# Patient Record
Sex: MALE | Race: OTHER | HISPANIC OR LATINO | URBAN - METROPOLITAN AREA
[De-identification: names, ages, dates, MRNs, and addresses within clinical notes are randomized per-mention and may not be internally consistent; named-entity substitution may affect disease eponyms.]

---

## 2020-04-26 ENCOUNTER — EMERGENCY (EMERGENCY)
Facility: HOSPITAL | Age: 40
LOS: 0 days | Discharge: HOME | End: 2020-04-27
Attending: EMERGENCY MEDICINE
Payer: SELF-PAY

## 2020-04-26 VITALS
HEART RATE: 105 BPM | DIASTOLIC BLOOD PRESSURE: 99 MMHG | RESPIRATION RATE: 18 BRPM | TEMPERATURE: 98 F | WEIGHT: 130.07 LBS | HEIGHT: 64 IN | SYSTOLIC BLOOD PRESSURE: 157 MMHG

## 2020-04-26 DIAGNOSIS — Z59.0 HOMELESSNESS: ICD-10-CM

## 2020-04-26 DIAGNOSIS — F10.120 ALCOHOL ABUSE WITH INTOXICATION, UNCOMPLICATED: ICD-10-CM

## 2020-04-26 DIAGNOSIS — R00.0 TACHYCARDIA, UNSPECIFIED: ICD-10-CM

## 2020-04-26 PROCEDURE — 80053 COMPREHEN METABOLIC PANEL: CPT

## 2020-04-26 PROCEDURE — 82962 GLUCOSE BLOOD TEST: CPT

## 2020-04-26 PROCEDURE — 99285 EMERGENCY DEPT VISIT HI MDM: CPT

## 2020-04-26 PROCEDURE — 36415 COLL VENOUS BLD VENIPUNCTURE: CPT

## 2020-04-26 PROCEDURE — 85025 COMPLETE CBC W/AUTO DIFF WBC: CPT

## 2020-04-26 PROCEDURE — 80307 DRUG TEST PRSMV CHEM ANLYZR: CPT

## 2020-04-26 PROCEDURE — 99284 EMERGENCY DEPT VISIT MOD MDM: CPT

## 2020-04-26 PROCEDURE — 93005 ELECTROCARDIOGRAM TRACING: CPT

## 2020-04-26 SDOH — ECONOMIC STABILITY - HOUSING INSECURITY: HOMELESSNESS: Z59.0

## 2020-04-26 NOTE — ED PROVIDER NOTE - PROGRESS NOTE DETAILS
Male BIBA found in parking Lot Homeless after drinking Beer. No abdominal pain ,no fever or chills. Seen and examined by attending in Intake. Plan: BGM and waiting to sober for discharge.   Ghassan STRONG JG:  Received signout from Dr. Duncan and TAE Giraldo to re-evaluate patient when awake and sober. Patient now awake, able to ambulate, able to give name and address and has [facility] identification card on him.  Registered with real name now instead of alias.  Requests help with housing and states he is homeless and has nowhere to go and needs help.  Requests social work help in AM.  Denies SI or HI.  Arrived in ED at same time as male cardiac arrest from same park location, but patient has no memory of event.  Knows he was drinking in same park as another male but states he doesn't know him well or know his name. Pt s/o to me.  Care assumed from Dr. Samano.  Presenting for etoh intox.  In need of housing and would like to discuss with SW.  Pending SW consult in AM for housing options. Pt noted to be tachy on reassessment.  Labs drawn, IVF  provided as well  as librium.  NO tremulousness.  Normal mental status.  Pt seen by SBIRT and declines placement in shelter.  Declines emergency housing as well.  Would just like to go home.  I discussed patient's elevated HR and he still declines to stay in ED for further w/u and management.  Understands possible risks of discharge.  Still would like to go.  D/c home with strict return precautions and prompt outpatient f/u.

## 2020-04-26 NOTE — ED PROVIDER NOTE - NSFOLLOWUPINSTRUCTIONS_ED_ALL_ED_FT
Alcohol Use Disorder    WHAT YOU NEED TO KNOW:    Alcohol use disorder (AUD) is problem drinking. AUD includes alcohol abuse and alcohol dependency.     DISCHARGE INSTRUCTIONS:    Seek care immediately if:     Your heart is beating faster than usual.      You have hallucinations.      You cannot remember what happens while you are drinking.      You have seizures.    Contact your healthcare provider if:     You are anxious and have nausea.      Your hands are shaky and you are sweating heavily.      You have questions or concerns about your condition or care.    Follow up with your healthcare provider as directed: Do not try to stop drinking on your own. Your healthcare provider may need to help you withdraw from alcohol safely. He may need to admit you to the hospital. You may also need any of the following treatments:    Medicines to decrease your craving for alcohol      Support groups such as Alcoholics Anonymous       Therapy from a psychiatrist or psychologist       Admission to an inpatient facility for treatment for severe AUD    Interested in discussing options to reduce your alcohol or drug use?      [facility]: [phone]   [facility] Substance Abuse Services: [phone], option #2   Methadone Maintenance & Ambulatory Opiate Detox: [phone]  [facility]: [phone]  [facility]: [phone]  [facility]: [phone]    [facility]: [phone], option #2   [facility]: [phone]    [facility]: [phone]    [facility] Central Intake: [phone]  [facility] Chemical Dependency/Ancillary Withdrawal: [phone]  [facility] Methadone Maintenance: [phone]    [facility]: [phone]  [facility] Addiction Treatment Services: [phone]    [facility] HopeLine: 0-191-6-HOPENY    [facility] Office of Alcoholism and Substance Abuse Services (OASAS): https://www.oasas.[place].gov/providerdirectory/  [facility] for Addiction Services and Psychotherapy Interventions Research (CASPIR)  www.[facility].org     Interested in discussing options to reduce your tobacco use?    [facility] for Tobacco Control:  [phone]  [facility] QUITLINE: 9-336-YN-QUITS ([phone])    Interested in learning more about substance use?      http://rethinkingdrinking.niaaa.nih.gov   https://www.drugabuse.gov/patients-families     Learn more about opioid overdose prevention programs in [facility]:  http://www.[facility].ny.[facility]/diseases/aids/general/opioid_overdose_prevention/

## 2020-04-26 NOTE — ED PROVIDER NOTE - PATIENT PORTAL LINK FT
You can access the FollowMyHealth Patient Portal offered by [facility] by registering at the following website: [email or web address]. By joining [employer]’s FollowMyHealth portal, you will also be able to view your health information using other applications (apps) compatible with our system.

## 2020-04-26 NOTE — ED ADULT NURSE NOTE - OBJECTIVE STATEMENT
pt was found outside intoxicated. pt speaks Chilean. pt states he drank a lot of beer and started crying saying he had no home. pt states he just needs to sleep it off.

## 2020-04-26 NOTE — ED ADULT NURSE NOTE - CHIEF COMPLAINT QUOTE
Pt found in public parking lot with alcohol at side. Unable to give  with a Divehi speaking RN.  Pt awake and alert, but appears intoxicated. No visible sign of injury noted.

## 2020-04-26 NOTE — ED PROVIDER NOTE - OBJECTIVE STATEMENT
62 y/o male with no PMHx, presents to the ED for alcohol intoxication. Pt was found in a public parking lot with alcohol at side. States he is homeless. Denies pain, fever, abd pain.

## 2020-04-26 NOTE — ED PROVIDER NOTE - CARE PLAN
Principal Discharge DX:	Alcoholic intoxication without complication  Secondary Diagnosis:	Tachycardia  Secondary Diagnosis:	Homelessness

## 2020-04-26 NOTE — ED ADULT TRIAGE NOTE - CHIEF COMPLAINT QUOTE
Pt found in public parking lot with alcohol at side. Unable to give  with a Malay speaking RN.  Pt awake and alert, but appears intoxicated. No visible sign of injury noted.

## 2020-04-27 VITALS
OXYGEN SATURATION: 94 % | SYSTOLIC BLOOD PRESSURE: 155 MMHG | HEART RATE: 120 BPM | DIASTOLIC BLOOD PRESSURE: 98 MMHG | RESPIRATION RATE: 18 BRPM

## 2020-04-27 LAB
ALBUMIN SERPL ELPH-MCNC: 3.9 G/DL — SIGNIFICANT CHANGE UP (ref 3.3–5)
ALP SERPL-CCNC: 96 U/L — SIGNIFICANT CHANGE UP (ref 40–120)
ALT FLD-CCNC: 314 U/L — HIGH (ref 12–78)
ANION GAP SERPL CALC-SCNC: 12 MMOL/L — SIGNIFICANT CHANGE UP (ref 5–17)
AST SERPL-CCNC: 251 U/L — HIGH (ref 15–37)
BASOPHILS # BLD AUTO: 0.04 K/UL — SIGNIFICANT CHANGE UP (ref 0–0.2)
BASOPHILS NFR BLD AUTO: 0.9 % — SIGNIFICANT CHANGE UP (ref 0–2)
BILIRUB SERPL-MCNC: 0.4 MG/DL — SIGNIFICANT CHANGE UP (ref 0.2–1.2)
BUN SERPL-MCNC: 11 MG/DL — SIGNIFICANT CHANGE UP (ref 7–23)
CALCIUM SERPL-MCNC: 8.7 MG/DL — SIGNIFICANT CHANGE UP (ref 8.5–10.1)
CHLORIDE SERPL-SCNC: 101 MMOL/L — SIGNIFICANT CHANGE UP (ref 96–108)
CO2 SERPL-SCNC: 23 MMOL/L — SIGNIFICANT CHANGE UP (ref 22–31)
CREAT SERPL-MCNC: 0.67 MG/DL — SIGNIFICANT CHANGE UP (ref 0.5–1.3)
EOSINOPHIL # BLD AUTO: 0.04 K/UL — SIGNIFICANT CHANGE UP (ref 0–0.5)
EOSINOPHIL NFR BLD AUTO: 0.9 % — SIGNIFICANT CHANGE UP (ref 0–6)
ETHANOL SERPL-MCNC: 71 MG/DL — HIGH (ref 0–10)
GLUCOSE SERPL-MCNC: 83 MG/DL — SIGNIFICANT CHANGE UP (ref 70–99)
HCT VFR BLD CALC: 40.3 % — SIGNIFICANT CHANGE UP (ref 39–50)
HGB BLD-MCNC: 14.3 G/DL — SIGNIFICANT CHANGE UP (ref 13–17)
IMM GRANULOCYTES NFR BLD AUTO: 0.2 % — SIGNIFICANT CHANGE UP (ref 0–1.5)
LYMPHOCYTES # BLD AUTO: 0.96 K/UL — LOW (ref 1–3.3)
LYMPHOCYTES # BLD AUTO: 21.8 % — SIGNIFICANT CHANGE UP (ref 13–44)
MCHC RBC-ENTMCNC: 32.1 PG — SIGNIFICANT CHANGE UP (ref 27–34)
MCHC RBC-ENTMCNC: 35.5 GM/DL — SIGNIFICANT CHANGE UP (ref 32–36)
MCV RBC AUTO: 90.6 FL — SIGNIFICANT CHANGE UP (ref 80–100)
MONOCYTES # BLD AUTO: 0.42 K/UL — SIGNIFICANT CHANGE UP (ref 0–0.9)
MONOCYTES NFR BLD AUTO: 9.5 % — SIGNIFICANT CHANGE UP (ref 2–14)
NEUTROPHILS # BLD AUTO: 2.93 K/UL — SIGNIFICANT CHANGE UP (ref 1.8–7.4)
NEUTROPHILS NFR BLD AUTO: 66.7 % — SIGNIFICANT CHANGE UP (ref 43–77)
PLATELET # BLD AUTO: 133 K/UL — LOW (ref 150–400)
POTASSIUM SERPL-MCNC: 3.5 MMOL/L — SIGNIFICANT CHANGE UP (ref 3.5–5.3)
POTASSIUM SERPL-SCNC: 3.5 MMOL/L — SIGNIFICANT CHANGE UP (ref 3.5–5.3)
PROT SERPL-MCNC: 8 GM/DL — SIGNIFICANT CHANGE UP (ref 6–8.3)
RBC # BLD: 4.45 M/UL — SIGNIFICANT CHANGE UP (ref 4.2–5.8)
RBC # FLD: 15.7 % — HIGH (ref 10.3–14.5)
SODIUM SERPL-SCNC: 136 MMOL/L — SIGNIFICANT CHANGE UP (ref 135–145)
WBC # BLD: 4.4 K/UL — SIGNIFICANT CHANGE UP (ref 3.8–10.5)
WBC # FLD AUTO: 4.4 K/UL — SIGNIFICANT CHANGE UP (ref 3.8–10.5)

## 2020-04-27 PROCEDURE — 93010 ELECTROCARDIOGRAM REPORT: CPT

## 2020-04-27 RX ORDER — SODIUM CHLORIDE 9 MG/ML
1000 INJECTION INTRAMUSCULAR; INTRAVENOUS; SUBCUTANEOUS ONCE
Refills: 0 | Status: COMPLETED | OUTPATIENT
Start: 2020-04-27 | End: 2020-04-27

## 2020-04-27 RX ADMIN — SODIUM CHLORIDE 1000 MILLILITER(S): 9 INJECTION INTRAMUSCULAR; INTRAVENOUS; SUBCUTANEOUS at 09:00

## 2020-04-27 RX ADMIN — Medication 100 MILLIGRAM(S): at 09:00

## 2020-04-27 NOTE — ED ADULT NURSE REASSESSMENT NOTE - NS ED NURSE REASSESS COMMENT FT1
pt resting comfortably in stretcher. awaiting SW consult  because pt is homeless and looking for housing. pt in nad. no complaints.

## 2024-02-21 ENCOUNTER — EMERGENCY (EMERGENCY)
Facility: HOSPITAL | Age: 44
LOS: 0 days | Discharge: HOME | End: 2024-02-21
Attending: FAMILY MEDICINE
Payer: SELF-PAY

## 2024-02-21 VITALS
HEART RATE: 63 BPM | RESPIRATION RATE: 16 BRPM | OXYGEN SATURATION: 98 % | DIASTOLIC BLOOD PRESSURE: 79 MMHG | SYSTOLIC BLOOD PRESSURE: 112 MMHG | TEMPERATURE: 98 F

## 2024-02-21 VITALS
HEART RATE: 60 BPM | HEIGHT: 65 IN | OXYGEN SATURATION: 100 % | SYSTOLIC BLOOD PRESSURE: 119 MMHG | RESPIRATION RATE: 16 BRPM | WEIGHT: 149.91 LBS | DIASTOLIC BLOOD PRESSURE: 94 MMHG | TEMPERATURE: 97 F

## 2024-02-21 DIAGNOSIS — R41.82 ALTERED MENTAL STATUS, UNSPECIFIED: ICD-10-CM

## 2024-02-21 DIAGNOSIS — F10.129 ALCOHOL ABUSE WITH INTOXICATION, UNSPECIFIED: ICD-10-CM

## 2024-02-21 PROBLEM — Z78.9 OTHER SPECIFIED HEALTH STATUS: Chronic | Status: ACTIVE | Noted: 2020-04-30

## 2024-02-21 LAB
ALBUMIN SERPL ELPH-MCNC: 4 G/DL — SIGNIFICANT CHANGE UP (ref 3.3–5)
ALP SERPL-CCNC: 68 U/L — SIGNIFICANT CHANGE UP (ref 40–120)
ALT FLD-CCNC: 31 U/L — SIGNIFICANT CHANGE UP (ref 12–78)
AMPHET UR-MCNC: NEGATIVE — SIGNIFICANT CHANGE UP
ANION GAP SERPL CALC-SCNC: 5 MMOL/L — SIGNIFICANT CHANGE UP (ref 5–17)
APPEARANCE UR: CLEAR — SIGNIFICANT CHANGE UP
APTT BLD: 32.3 SEC — SIGNIFICANT CHANGE UP (ref 24.5–35.6)
AST SERPL-CCNC: 23 U/L — SIGNIFICANT CHANGE UP (ref 15–37)
BARBITURATES UR SCN-MCNC: NEGATIVE — SIGNIFICANT CHANGE UP
BASOPHILS # BLD AUTO: 0.08 K/UL — SIGNIFICANT CHANGE UP (ref 0–0.2)
BASOPHILS NFR BLD AUTO: 1.2 % — SIGNIFICANT CHANGE UP (ref 0–2)
BENZODIAZ UR-MCNC: NEGATIVE — SIGNIFICANT CHANGE UP
BILIRUB SERPL-MCNC: 0.3 MG/DL — SIGNIFICANT CHANGE UP (ref 0.2–1.2)
BILIRUB UR-MCNC: NEGATIVE — SIGNIFICANT CHANGE UP
BUN SERPL-MCNC: 11 MG/DL — SIGNIFICANT CHANGE UP (ref 7–23)
CALCIUM SERPL-MCNC: 9 MG/DL — SIGNIFICANT CHANGE UP (ref 8.5–10.1)
CHLORIDE SERPL-SCNC: 109 MMOL/L — HIGH (ref 96–108)
CO2 SERPL-SCNC: 29 MMOL/L — SIGNIFICANT CHANGE UP (ref 22–31)
COCAINE METAB.OTHER UR-MCNC: NEGATIVE — SIGNIFICANT CHANGE UP
COLOR SPEC: YELLOW — SIGNIFICANT CHANGE UP
CREAT SERPL-MCNC: 0.85 MG/DL — SIGNIFICANT CHANGE UP (ref 0.5–1.3)
DIFF PNL FLD: NEGATIVE — SIGNIFICANT CHANGE UP
EGFR: 109 ML/MIN/1.73M2 — SIGNIFICANT CHANGE UP
EOSINOPHIL # BLD AUTO: 0.12 K/UL — SIGNIFICANT CHANGE UP (ref 0–0.5)
EOSINOPHIL NFR BLD AUTO: 1.8 % — SIGNIFICANT CHANGE UP (ref 0–6)
ETHANOL SERPL-MCNC: 356 MG/DL — HIGH (ref 0–10)
GLUCOSE SERPL-MCNC: 97 MG/DL — SIGNIFICANT CHANGE UP (ref 70–99)
GLUCOSE UR QL: NEGATIVE MG/DL — SIGNIFICANT CHANGE UP
HCT VFR BLD CALC: 43.4 % — SIGNIFICANT CHANGE UP (ref 39–50)
HGB BLD-MCNC: 15.5 G/DL — SIGNIFICANT CHANGE UP (ref 13–17)
IMM GRANULOCYTES NFR BLD AUTO: 0.4 % — SIGNIFICANT CHANGE UP (ref 0–0.9)
INR BLD: 0.94 RATIO — SIGNIFICANT CHANGE UP (ref 0.85–1.18)
KETONES UR-MCNC: NEGATIVE MG/DL — SIGNIFICANT CHANGE UP
LEUKOCYTE ESTERASE UR-ACNC: NEGATIVE — SIGNIFICANT CHANGE UP
LYMPHOCYTES # BLD AUTO: 2.02 K/UL — SIGNIFICANT CHANGE UP (ref 1–3.3)
LYMPHOCYTES # BLD AUTO: 29.6 % — SIGNIFICANT CHANGE UP (ref 13–44)
MAGNESIUM SERPL-MCNC: 2.6 MG/DL — SIGNIFICANT CHANGE UP (ref 1.6–2.6)
MCHC RBC-ENTMCNC: 33 PG — SIGNIFICANT CHANGE UP (ref 27–34)
MCHC RBC-ENTMCNC: 35.7 GM/DL — SIGNIFICANT CHANGE UP (ref 32–36)
MCV RBC AUTO: 92.3 FL — SIGNIFICANT CHANGE UP (ref 80–100)
METHADONE UR-MCNC: NEGATIVE — SIGNIFICANT CHANGE UP
MONOCYTES # BLD AUTO: 0.42 K/UL — SIGNIFICANT CHANGE UP (ref 0–0.9)
MONOCYTES NFR BLD AUTO: 6.2 % — SIGNIFICANT CHANGE UP (ref 2–14)
NEUTROPHILS # BLD AUTO: 4.15 K/UL — SIGNIFICANT CHANGE UP (ref 1.8–7.4)
NEUTROPHILS NFR BLD AUTO: 60.8 % — SIGNIFICANT CHANGE UP (ref 43–77)
NITRITE UR-MCNC: NEGATIVE — SIGNIFICANT CHANGE UP
OPIATES UR-MCNC: NEGATIVE — SIGNIFICANT CHANGE UP
PCP SPEC-MCNC: SIGNIFICANT CHANGE UP
PCP UR-MCNC: NEGATIVE — SIGNIFICANT CHANGE UP
PH UR: 5.5 — SIGNIFICANT CHANGE UP (ref 5–8)
PLATELET # BLD AUTO: 236 K/UL — SIGNIFICANT CHANGE UP (ref 150–400)
POTASSIUM SERPL-MCNC: 3.7 MMOL/L — SIGNIFICANT CHANGE UP (ref 3.5–5.3)
POTASSIUM SERPL-SCNC: 3.7 MMOL/L — SIGNIFICANT CHANGE UP (ref 3.5–5.3)
PROT SERPL-MCNC: 7.9 GM/DL — SIGNIFICANT CHANGE UP (ref 6–8.3)
PROT UR-MCNC: NEGATIVE MG/DL — SIGNIFICANT CHANGE UP
PROTHROM AB SERPL-ACNC: 10.6 SEC — SIGNIFICANT CHANGE UP (ref 9.5–13)
RBC # BLD: 4.7 M/UL — SIGNIFICANT CHANGE UP (ref 4.2–5.8)
RBC # FLD: 13.2 % — SIGNIFICANT CHANGE UP (ref 10.3–14.5)
SODIUM SERPL-SCNC: 143 MMOL/L — SIGNIFICANT CHANGE UP (ref 135–145)
SP GR SPEC: <1.005 — LOW (ref 1–1.03)
THC UR QL: NEGATIVE — SIGNIFICANT CHANGE UP
TROPONIN I, HIGH SENSITIVITY RESULT: 4.48 NG/L — SIGNIFICANT CHANGE UP
UROBILINOGEN FLD QL: 0.2 MG/DL — SIGNIFICANT CHANGE UP (ref 0.2–1)
WBC # BLD: 6.82 K/UL — SIGNIFICANT CHANGE UP (ref 3.8–10.5)
WBC # FLD AUTO: 6.82 K/UL — SIGNIFICANT CHANGE UP (ref 3.8–10.5)

## 2024-02-21 PROCEDURE — 85025 COMPLETE CBC W/AUTO DIFF WBC: CPT

## 2024-02-21 PROCEDURE — 36415 COLL VENOUS BLD VENIPUNCTURE: CPT

## 2024-02-21 PROCEDURE — 84484 ASSAY OF TROPONIN QUANT: CPT

## 2024-02-21 PROCEDURE — 80053 COMPREHEN METABOLIC PANEL: CPT

## 2024-02-21 PROCEDURE — 80307 DRUG TEST PRSMV CHEM ANLYZR: CPT

## 2024-02-21 PROCEDURE — 83735 ASSAY OF MAGNESIUM: CPT

## 2024-02-21 PROCEDURE — 85730 THROMBOPLASTIN TIME PARTIAL: CPT

## 2024-02-21 PROCEDURE — 96374 THER/PROPH/DIAG INJ IV PUSH: CPT

## 2024-02-21 PROCEDURE — 85610 PROTHROMBIN TIME: CPT

## 2024-02-21 PROCEDURE — 99285 EMERGENCY DEPT VISIT HI MDM: CPT | Mod: 25

## 2024-02-21 PROCEDURE — 99284 EMERGENCY DEPT VISIT MOD MDM: CPT

## 2024-02-21 PROCEDURE — 81003 URINALYSIS AUTO W/O SCOPE: CPT

## 2024-02-21 RX ORDER — SODIUM CHLORIDE 9 MG/ML
1000 INJECTION INTRAMUSCULAR; INTRAVENOUS; SUBCUTANEOUS ONCE
Refills: 0 | Status: COMPLETED | OUTPATIENT
Start: 2024-02-21 | End: 2024-02-21

## 2024-02-21 RX ORDER — THIAMINE MONONITRATE (VIT B1) 100 MG
100 TABLET ORAL ONCE
Refills: 0 | Status: COMPLETED | OUTPATIENT
Start: 2024-02-21 | End: 2024-02-21

## 2024-02-21 RX ADMIN — SODIUM CHLORIDE 1000 MILLILITER(S): 9 INJECTION INTRAMUSCULAR; INTRAVENOUS; SUBCUTANEOUS at 15:57

## 2024-02-21 RX ADMIN — Medication 100 MILLIGRAM(S): at 15:56

## 2024-02-21 NOTE — ED PROVIDER NOTE - CONSTITUTIONAL, MLM
Well appearing, awake, alert, oriented to person, place, time/situation and in no apparent distress. Answerers questions. Appears to have urinated on himself, front part of his pants are wet. Poor hygiene. normal...

## 2024-02-21 NOTE — ED PROVIDER NOTE - PROGRESS NOTE DETAILS
Valentino Estes for Dr Puente   Pt states he has a wife who will come pick him up. Pt has received only half of the IV bag, but states he feels well.

## 2024-02-21 NOTE — ED ADULT NURSE REASSESSMENT NOTE - NS ED NURSE REASSESS COMMENT FT1
Pt's belongings placed into a property bag, pt's pants changed after being soiled. Safety socks applied.

## 2024-02-21 NOTE — ED PROVIDER NOTE - OBJECTIVE STATEMENT
45 yo male w/ no pertinent PMHx presents to the ED BIBEMS for AMS. Pt was picked up on the street with an empty bottle of vodka. Pt denies falls and hitting his head, states he drinks every day.

## 2024-02-21 NOTE — ED ADULT NURSE REASSESSMENT NOTE - NS ED NURSE REASSESS COMMENT FT1
Attempted to call wife in regards to . No answer at this time. pt has no complaints. pt does not appear in discomfort or distress at this time. Safety and comfort maintained.

## 2024-02-21 NOTE — ED PROVIDER NOTE - PATIENT PORTAL LINK FT
You can access the FollowMyHealth Patient Portal offered by [facility] by registering at the following website: [email or web address]. By joining [employer]’s FollowMyHealth portal, you will also be able to view your health information using other applications (apps) compatible with our system.

## 2024-02-21 NOTE — ED ADULT NURSE REASSESSMENT NOTE - NS ED NURSE REASSESS COMMENT FT1
Received report from previous RN Jackeline. Pt does not offer any complaints at this time. Vital signs stable as per EMR. Safety and comfort maintained. Respirations appear even and unlabored, in NAD.

## 2024-02-21 NOTE — ED ADULT NURSE REASSESSMENT NOTE - NS ED NURSE REASSESS COMMENT FT1
Rn respoke to pt's wife Елена Saez, phone number [phone], unable to get a ride to the hospital, she states pt will have to take a taxi back to their house when he is discharged.

## 2024-02-21 NOTE — ED ADULT NURSE REASSESSMENT NOTE - NS ED NURSE REASSESS COMMENT FT1
Pt is breathing unlabored and spontaneously. Respirations even. Pt has no complaints at this time.pt does not appear to be in discomfort or distress at this time. Pt awaiting . safety and comfort maintained.

## 2024-02-21 NOTE — ED PROVIDER NOTE - CLINICAL SUMMARY MEDICAL DECISION MAKING FREE TEXT BOX
Pt who drank and drinks every day brought in after drinking a lot, has wet pants, may have had a seizure. Will give IVF do levels labs and observe and give thiamin.

## 2024-02-21 NOTE — ED ADULT NURSE NOTE - OBJECTIVE STATEMENT
Pt BIBEMS to the ED with c/o AMS. According to triage note pt was found on the street. Pt is A&OX2, GCS 14. Pt is confused and has slurred speech. Pt admits to drinking alcohol but doesn't remember how much and says "not much" in Chinese. Pt denies any pain at this time. Pt unable to say if he fell. Pt is in the hallway infront of the nursing stations, bed alarm applied and side rails up.

## 2024-02-21 NOTE — ED ADULT TRIAGE NOTE - CHIEF COMPLAINT QUOTE
Pt BIBEMS for AMS. Pt found on the street next to an empty bottle of vodka. + AOB. No obvious trauma noted. Pt placed in view of nurses station for safety.

## 2024-02-21 NOTE — ED ADULT NURSE REASSESSMENT NOTE - NS ED NURSE REASSESS COMMENT FT1
Spoke with pt's wife in regards to pickup. As per pt's wife, wife will send taxi. MD Puente aware. As per MD Scherer, pt stable for discharge.

## 2024-04-02 ENCOUNTER — EMERGENCY (EMERGENCY)
Facility: HOSPITAL | Age: 44
LOS: 0 days | Discharge: HOME | End: 2024-04-02
Attending: STUDENT IN AN ORGANIZED HEALTH CARE EDUCATION/TRAINING PROGRAM
Payer: SELF-PAY

## 2024-04-02 VITALS
WEIGHT: 160.06 LBS | OXYGEN SATURATION: 99 % | DIASTOLIC BLOOD PRESSURE: 102 MMHG | HEART RATE: 98 BPM | SYSTOLIC BLOOD PRESSURE: 162 MMHG | TEMPERATURE: 98 F | RESPIRATION RATE: 18 BRPM | HEIGHT: 65 IN

## 2024-04-02 VITALS
HEART RATE: 98 BPM | SYSTOLIC BLOOD PRESSURE: 149 MMHG | DIASTOLIC BLOOD PRESSURE: 108 MMHG | RESPIRATION RATE: 18 BRPM | OXYGEN SATURATION: 99 % | TEMPERATURE: 98 F

## 2024-04-02 DIAGNOSIS — F10.129 ALCOHOL ABUSE WITH INTOXICATION, UNSPECIFIED: ICD-10-CM

## 2024-04-02 DIAGNOSIS — R41.82 ALTERED MENTAL STATUS, UNSPECIFIED: ICD-10-CM

## 2024-04-02 DIAGNOSIS — F10.10 ALCOHOL ABUSE, UNCOMPLICATED: ICD-10-CM

## 2024-04-02 PROCEDURE — 99284 EMERGENCY DEPT VISIT MOD MDM: CPT

## 2024-04-02 PROCEDURE — 99283 EMERGENCY DEPT VISIT LOW MDM: CPT

## 2024-04-02 RX ADMIN — Medication 50 MILLIGRAM(S): at 22:42

## 2024-04-02 NOTE — ED PROVIDER NOTE - PATIENT PORTAL LINK FT
You can access the FollowMyHealth Patient Portal offered by [facility] by registering at the following website: [email or web address]. By joining [facility] [employer]’s FollowMyHealth portal, you will also be able to view your health information using other applications (apps) compatible with our system.

## 2024-04-02 NOTE — ED PROVIDER NOTE - OBJECTIVE STATEMENT
43 y/o male presents to the ED c/o etoh intoxication. States he drinks 1 bottle of rum a day. : [id number]. 43 y/o male presents to the ED c/o etoh intoxication. Patient has no complaints. States he drinks 1 bottle of rum a day. : [id number].

## 2024-04-02 NOTE — ED PROVIDER NOTE - NSFOLLOWUPINSTRUCTIONS_ED_ALL_ED_FT
RESOURCES: DRUG AND ALCOHOL ABUSE    HELP HOTLINES:  * Al-Anon    [phone]  * Alcoholics Anonymous [phone];                                             [phone];                                             [phone];                                             [phone]  * Narcotics Anonymous [phone];                                            [phone];                                            [phone];                                            [phone]  * Pills/Drugs Anonymous    [phone]    GENERAL INFORMATION:  - [facility] Helpline:   403 ALCOHOL  - Alcohol & Drug Hotline: 320.762.1503  - [facility] Office for Substance Abuse Information Line: [phone]    OUTPATIENT CLINICS:  - [facility]: [phone]  - [facility]: [phone]   - [facility] Chemical Dependency [phone]    INPATIENT TREATMENT FACILITIES  - [facility]: [phone]  - [facility] Detox/Rehab: [phone]  - [address] Detox: [phone]  - [facility] Detox/Rehab: [phone]  - [facility] (Rehab only): [phone]  - [facility] Detox/Rehab: [phone]  - [facility]: [phone]  [facility] Detox: [phone]  - [facility] (Rehab only): [phone]  - [facility] Substance Abuse Treatment: [phone]  - [facility] Drug & Alcohol Help Line: [phone]    SOBER STATION:  - [facility] [place] ([facility]): [phone]  - [facility]: [phone]  - [facility] Alcohol Crisis Center: [phone]

## 2024-04-02 NOTE — ED ADULT NURSE NOTE - NSFALLRISKINTERV_ED_ALL_ED
Assistance OOB with selected safe patient handling equipment if applicable/Assistance with ambulation/Communicate fall risk and risk factors to all staff, patient, and family/Monitor gait and stability/Monitor for mental status changes and reorient to person, place, and time, as needed/Provide visual cue: yellow wristband, yellow gown, etc/Reinforce activity limits and safety measures with patient and family/Toileting schedule using arm’s reach rule for commode and bathroom/Use of alarms - bed, stretcher, chair and/or video monitoring/Call bell, personal items and telephone in reach/Instruct patient to call for assistance before getting out of bed/chair/stretcher/Non-slip footwear applied when patient is off stretcher/[place] to call system/Physically safe environment - no spills, clutter or unnecessary equipment/Purposeful Proactive Rounding/Room/bathroom lighting operational, light cord in reach

## 2024-04-02 NOTE — ED PROVIDER NOTE - CLINICAL SUMMARY MEDICAL DECISION MAKING FREE TEXT BOX
Adult male coming in after drinking etoh. Has no complaints, well appearing. Will await sobriety and dc.

## 2024-04-02 NOTE — ED ADULT NURSE NOTE - CAS ELECT INFOMATION PROVIDED
Education on discharge instructions with verbal understanding to myself.  [id number] utilized/DC instructions

## 2024-04-02 NOTE — ED PROVIDER NOTE - CONSTITUTIONAL, MLM
normal... Well appearing, awake, alert, oriented to person, place, time/situation and in no apparent distress. Well appearing, awake, alert, oriented to person, place, time/situation and in no apparent distress, appears intoxicated, smiling

## 2024-04-02 NOTE — ED ADULT NURSE NOTE - OBJECTIVE STATEMENT
Pt presents to ED w c/o etoh intoxication, pt has no c/o at this time. pt states that he drinks a bottle of rum a day and last time he consumed rum was before he came into ED. pt does not appear to be in any distress, respirations are even and unlabored.  MD Aguilar w pt during time of assessment. Pt presents to ED w c/o etoh intoxication, pt has no c/o at this time. pt states that he drinks a bottle of rum a day and last time he consumed rum was before he came into ED. pt does not appear to be in any distress, respirations are even and unlabored.  MD Aguilar w pt during time of assessment.  [id number] utilized

## 2024-04-02 NOTE — ED ADULT TRIAGE NOTE - CHIEF COMPLAINT QUOTE
patient found by EMS walking in the rain.  patient denies medical complains, + etoh use. no s/s of trauma

## 2024-04-16 ENCOUNTER — EMERGENCY (EMERGENCY)
Facility: HOSPITAL | Age: 44
LOS: 0 days | Discharge: HOME | End: 2024-04-17
Attending: EMERGENCY MEDICINE
Payer: SELF-PAY

## 2024-04-16 VITALS
TEMPERATURE: 98 F | SYSTOLIC BLOOD PRESSURE: 136 MMHG | OXYGEN SATURATION: 96 % | HEART RATE: 97 BPM | HEIGHT: 65 IN | DIASTOLIC BLOOD PRESSURE: 90 MMHG | RESPIRATION RATE: 18 BRPM

## 2024-04-16 DIAGNOSIS — F10.129 ALCOHOL ABUSE WITH INTOXICATION, UNSPECIFIED: ICD-10-CM

## 2024-04-16 DIAGNOSIS — F10.120 ALCOHOL ABUSE WITH INTOXICATION, UNCOMPLICATED: ICD-10-CM

## 2024-04-16 PROCEDURE — 72125 CT NECK SPINE W/O DYE: CPT | Mod: 26,MC

## 2024-04-16 PROCEDURE — 70450 CT HEAD/BRAIN W/O DYE: CPT | Mod: MC

## 2024-04-16 PROCEDURE — 72125 CT NECK SPINE W/O DYE: CPT | Mod: MC

## 2024-04-16 PROCEDURE — 70450 CT HEAD/BRAIN W/O DYE: CPT | Mod: 26,MC

## 2024-04-16 PROCEDURE — 99285 EMERGENCY DEPT VISIT HI MDM: CPT | Mod: 25

## 2024-04-16 PROCEDURE — 99284 EMERGENCY DEPT VISIT MOD MDM: CPT

## 2024-04-16 NOTE — ED PROVIDER NOTE - PSYCHIATRIC, MLM
Alert and oriented to person, place, time/situation. normal mood and affect. no apparent risk to self or others. Detail Level: Simple

## 2024-04-16 NOTE — ED PROVIDER NOTE - CONSTITUTIONAL, MLM
Well appearing, awake, alert, oriented to person, place, time/situation and in no apparent distress. slurred speech, no sings of trauma normal...

## 2024-04-16 NOTE — ED PROVIDER NOTE - PATIENT PORTAL LINK FT
You can access the FollowMyHealth Patient Portal offered by [facility] by registering at the following website: [email or web address]. By joining [email or web address]’s FollowMyHealth portal, you will also be able to view your health information using other applications (apps) compatible with our system.

## 2024-04-16 NOTE — ED PROVIDER NOTE - CPE EDP RESP NORM
Detail Level: Zone Render In Strict Bullet Format?: No Initiate Treatment: .\\n-Jublia 10 % topical solution with applicator Apply once daily on the toe nails at night. normal... symmetric

## 2024-04-16 NOTE — ED ADULT NURSE NOTE - NSFALLRISKINTERV_ED_ALL_ED
Assistance OOB with selected safe patient handling equipment if applicable/Assistance with ambulation/Communicate fall risk and risk factors to all staff, patient, and family/Monitor gait and stability/Monitor for mental status changes and reorient to person, place, and time, as needed/Provide visual cue: yellow wristband, yellow gown, etc/Reinforce activity limits and safety measures with patient and family/Toileting schedule using arm’s reach rule for commode and bathroom/Use of alarms - bed, stretcher, chair and/or video monitoring/Call bell, personal items and telephone in reach/Instruct patient to call for assistance before getting out of bed/chair/stretcher/Non-slip footwear applied when patient is off stretcher/[place] to call system/Physically safe environment - no spills, clutter or unnecessary equipment/Purposeful Proactive Rounding/Room/bathroom lighting operational, light cord in reach
no fever and no chills.

## 2024-04-16 NOTE — ED PROVIDER NOTE - CLINICAL SUMMARY MEDICAL DECISION MAKING FREE TEXT BOX
Ddx: etoh intoxication, no trauma   Plan: sobriety, initially CT ordered for patient when was less responsive.

## 2024-04-16 NOTE — ED PROVIDER NOTE - PROGRESS NOTE DETAILS
Pt imaging negative, pt is now awake and alert, with clear speech, steady gait. Pt clinically sober for d/c.

## 2024-04-16 NOTE — ED ADULT NURSE NOTE - OBJECTIVE STATEMENT
Pt BIBEMS c/o AMS. per ems pt was drinking in public, EMS noted that pt had unsteady gait. pt offers no complaints

## 2024-04-16 NOTE — ED ADULT TRIAGE NOTE - CHIEF COMPLAINT QUOTE
Pt BIBEMS from the street. As per EMS, "him and a bunch of guys were drinking behind a store and the  told them to leave but he couldn't walk straight because they were all drunk". Endorses alcohol use. +AOB. Airway patent. No obvious injury/trauma noted.

## 2024-04-16 NOTE — ED PROVIDER NOTE - DISPOSITION TYPE
Orders Placed This Encounter    AMB SUPPLY ORDER     Diagnosis: Sleep Apnea ICD-10 Code (G47.33)    Positive Airway Pressure Therapy: Duration of need: 99 months. Respironics DreamStation (Auto Mode):  IPAP: 18 cmH2O; Minimum EPAP: 4 cmH2O. PS min: 6, PS max: 8, Ramp Time: 20 Minutes; Flex: 2. CPAP mask -  As fitted during titration OR patient preference, headgear, heated tubing, and filtesr;  heated humidifier; wireless modem. Remote monitoring enrollment. Selene Connell MD, FAASM; NPI: [id number]  Electronically signed.  08/24/19 DISCHARGE

## 2024-04-16 NOTE — ED PROVIDER NOTE - OBJECTIVE STATEMENT
45 y/o male with PMHx of etoh abuse presents to the ED c/o etoh intoxication. Patient admits he drank a lot of etoh today. Denies trauma, cp, sob, abdominal pain, any other complaints.

## 2024-04-17 VITALS
RESPIRATION RATE: 19 BRPM | OXYGEN SATURATION: 98 % | DIASTOLIC BLOOD PRESSURE: 84 MMHG | SYSTOLIC BLOOD PRESSURE: 130 MMHG | HEART RATE: 90 BPM | TEMPERATURE: 98 F

## 2024-05-08 ENCOUNTER — EMERGENCY (EMERGENCY)
Facility: HOSPITAL | Age: 44
LOS: 0 days | Discharge: HOME | End: 2024-05-09
Attending: EMERGENCY MEDICINE
Payer: SELF-PAY

## 2024-05-08 VITALS
HEIGHT: 65 IN | TEMPERATURE: 98 F | DIASTOLIC BLOOD PRESSURE: 94 MMHG | OXYGEN SATURATION: 94 % | RESPIRATION RATE: 20 BRPM | WEIGHT: 160.06 LBS | HEART RATE: 83 BPM | SYSTOLIC BLOOD PRESSURE: 125 MMHG

## 2024-05-08 DIAGNOSIS — F10.229 ALCOHOL DEPENDENCE WITH INTOXICATION, UNSPECIFIED: ICD-10-CM

## 2024-05-08 PROCEDURE — 82962 GLUCOSE BLOOD TEST: CPT

## 2024-05-08 PROCEDURE — 99284 EMERGENCY DEPT VISIT MOD MDM: CPT

## 2024-05-08 PROCEDURE — 99285 EMERGENCY DEPT VISIT HI MDM: CPT

## 2024-05-08 NOTE — ED PROVIDER NOTE - CONSTITUTIONAL, MLM
Well appearing, awake, alert, oriented to person, place, time/situation and in no apparent distress. Clinically intoxicated normal...

## 2024-05-08 NOTE — ED ADULT NURSE NOTE - NSFALLRISKINTERV_ED_ALL_ED
Assistance OOB with selected safe patient handling equipment if applicable/Assistance with ambulation/Communicate fall risk and risk factors to all staff, patient, and family/Monitor gait and stability/Monitor for mental status changes and reorient to person, place, and time, as needed/Provide visual cue: yellow wristband, yellow gown, etc/Reinforce activity limits and safety measures with patient and family/Toileting schedule using arm’s reach rule for commode and bathroom/Use of alarms - bed, stretcher, chair and/or video monitoring/Call bell, personal items and telephone in reach/Instruct patient to call for assistance before getting out of bed/chair/stretcher/Non-slip footwear applied when patient is off stretcher/[place] to call system/Physically safe environment - no spills, clutter or unnecessary equipment/Purposeful Proactive Rounding/Room/bathroom lighting operational, light cord in reach

## 2024-05-08 NOTE — ED ADULT NURSE NOTE - CHIEF COMPLAINT QUOTE
PT presents to er brought in by ems found on the street drinking multiple drinks, pt is intoxicated upon arrival, answering questions, denies any complaints at this time.

## 2024-05-08 NOTE — ED ADULT TRIAGE NOTE - CHIEF COMPLAINT QUOTE
Administrations This Visit     ALLERGEN EXTRACT 0.1 mL     Admin Date  10/05/2020  15:35 Action  Given Dose  0.1 mL Route  Subcutaneous Site  Arm Left Administered By  Chaparrita Leblanc CMA ([address] [place] [address])    Ordering Provider:  Hitesh Martinez MD    Patient Supplied?:  Yes    Comments:  0.3ml subcutaneous left upper arm vial a           Admin Date  10/05/2020  15:36 Action  Given Dose  0.1 mL Route  Subcutaneous Site  Arm Right Administered By  Juvencio Cervantes ([address] [place] [address])    Ordering Provider:  Hitesh Martinez MD    Patient Supplied?:  Yes    Comments:  0.3ml subcutaneous right upper arm vial b                Patient instructed to  report any adverse reaction to me immediately. Pt supplied  Small redness noted on right arm.
5
PT presents to er brought in by ems found on the street drinking multiple drinks, pt is intoxicated upon arrival, answering questions, denies any complaints at this time.

## 2024-05-08 NOTE — ED PROVIDER NOTE - CLINICAL SUMMARY MEDICAL DECISION MAKING FREE TEXT BOX
43 y/o M with PMHx of EtOH abuse presents to the ED BIBEMS for intoxication. Pt was found on the street drinking multiple drinks. Admits to drinking a lot of alcohol today. Pt intoxicated, exam otherwise benign.  Plan on PO fluids, observe, expect dc when more alert and self ambulatory with steady gait. 43 y/o M with PMHx of EtOH abuse presents to the ED BIBEMS for intoxication. Pt was found on the street drinking multiple drinks. Admits to drinking a lot of alcohol today. Pt intoxicated, exam otherwise benign.  Plan on PO fluids, observe, expect dc when more alert and self ambulatory with steady gait.    23:15, C MD Amaya:  Case endorsed to Dr. Russo:  observe, reassess when clinically sober incl. ambulation trial.  Expect DC.

## 2024-05-08 NOTE — ED ADULT NURSE NOTE - OBJECTIVE STATEMENT
pt intoxicated, as per EMS brought in for drinking on the street. pt awake and alert, denies pain, fall, headstrike, CP, SOB, Ha/dizziness at this time. pt ambulatory with positive movement x 4 extremities, acting calm and appropriate. pt in view of nursing station

## 2024-05-08 NOTE — ED PROVIDER NOTE - OBJECTIVE STATEMENT
45 y/o M with PMHx of EtOH abuse presents to the ED BIBEMS for intoxication. Pt was found on the street drinking multiple drinks. Admits to drinking a lot of alcohol today. Denies HA, n/v, pain to body, SOB.

## 2024-05-08 NOTE — ED PROVIDER NOTE - NSFOLLOWUPINSTRUCTIONS_ED_ALL_ED_FT
Avoid abusing alcohol.  Drink more non-alcoholic oral fluids.      Trastorno por consumo de bebidas alcohólicas  Alcohol Use Disorder  El trastorno por consumo de bebidas alcohólicas es fabiana afección en la que el consumo de alcohol altera la jane cotidiana. Las personas con esta afección consumen bebidas alcohólicas en exceso y no pueden controlar madrid consumo.    El trastorno por consumo de bebidas alcohólicas puede causar problemas graves en la cassie física. Puede afectar al cerebro, al corazón y a otros órganos internos. Becky trastorno puede aumentar el riesgo de padecer ciertos tipos de cáncer y causar problemas de cassie mental, edmundo depresión o ansiedad.    ¿Cuáles son las causas?  Esta afección se debe al consumo excesivo de alcohol con el transcurso del tiempo. Algunas personas que sufren esta afección beben para enfrentarse a situaciones negativas de la jane o escapar de ellas. Otros beben para aliviar los síntomas de dolor físico o los síntomas de fabiana enfermedad mental.    ¿Qué incrementa el riesgo?  Es más probable que sufra esta afección si:  Tiene antecedentes familiares de trastorno por consumo de bebidas alcohólicas.  Madrid cultura fomenta el consumo de bebidas alcohólicas hasta el punto de emborracharse (intoxicación alcohólica).  Tuvo un trastorno emocional o de conducta en la infancia.  Ha sufrido abusos.  Es adolescente y:  Tiene un desempeño deficiente en la escuela.  Recibe poca supervisión o guía.  Actúa de forma impulsiva y le gusta abiel riesgos.  ¿Cuáles son los signos o síntomas?  Los síntomas de esta afección incluyen:  Beber más de lo que desea.  Intentar beber menos en varias ocasiones, sin éxito.  Pasar mucho tiempo pensando en el alcohol, intentando conseguir alcohol, bebiendo alcohol o recuperándose de dawson bebido alcohol.  Continuar bebiendo incluso cuando esto le causa problemas graves en madrid jane cotidiana.  Beber cuando es peligroso hacerlo, por ejemplo, antes de conducir.  Necesitar cada vez más alcohol para obtener el mismo efecto que busca (generar tolerancia).  Tener síntomas de abstinencia al dejar de beber. Entre los síntomas de abstinencia se incluyen los siguientes:  Dificultad para dormir, que produce cansancio (fatiga).  Cambios en el estado de ánimo, con depresión y ansiedad.  Síntomas físicos, edmundo frecuencia cardíaca acelerada, respiración rápida, presión arterial elevada (hipertensión), fiebre, sudoración fría o náuseas.  Convulsiones.  Confusión grave.  Ceci o sentir cosas que no existen (alucinaciones).  Temblores que no se pueden controlar.  ¿Cómo se diagnostica?  Esta afección se diagnostica mediante fabiana evaluación. Para comenzar, el médico puede hacerle tenzin o cuatro preguntas sobre madrid consumo de alcohol o entregarle fabiana prueba sencilla que deberá realizar. [place] ayudará a obtener información rafi sobre usted.    Pueden hacerle un examen físico o análisis. Pueden derivarlo a un terapeuta especializado en abuso de sustancias.    ¿Cómo se trata?  Two people talking with a counselor.  Con la educación, algunas personas con trastorno por consumo de bebidas alcohólicas pueden reducir madrid consumo. Muchas personas con becky trastorno no son capaces de modificar madrid comportamiento por madrid cuenta y necesitan la ayuda de un tratamiento por parte de especialistas en abuso de sustancias. Los tratamientos pueden incluir lo siguiente:  Desintoxicación. La desintoxicación consiste en dejar de beber, con la supervisión y las instrucciones de los médicos. El médico puede recetarle medicamentos en la primera semana para ayudar a disminuir los síntomas de la abstinencia. La abstinencia puede ser peligrosa y potencialmente mortal. La desintoxicación puede realizarse en el hogar, en un ámbito extrahospitalario, en un centro de atención primaria, en un hospital o en un centro de tratamiento para abuso de sustancias.  Asesoramiento psicológico. Puede incluir entrevistas motivacionales (EM), terapia familiar o terapia cognitivo-conductual (TCC). Un terapeuta puede abordar cómo cambiar madrid comportamiento de consumo de bebidas alcohólicas y cómo mantener los cambios. La terapia tiene edmundo objetivos:  Identificar merced motivaciones positivas para cambiar.  Identificar y evitar aquello que lo conduzca a beber alcohol.  Enseñarle a planificar madrid cambio de comportamiento.  Proponer sistemas de apoyo que puedan ayudarlo a mantener el cambio.  Medicamentos. Los medicamentos pueden ayudar a tratar becky trastorno de la siguiente manera:  Disminuyen el deseo intenso de consumir.  Reducen el sentimiento positivo que experimenta al beber alcohol.  Causan fabiana reacción física desagradable cuando renzo alcohol (terapia de aversión).  Grupos de apoyo edmundo Alcohólicos Anónimos (AA). Estos grupos son dirigidos por personas que gregory superado madrid problema con la bebida. Estos grupos brindan apoyo emocional, consejos y orientación.  Algunas personas con esta afección se benefician del tratamiento combinado que se ofrece en algunos centros de tratamiento especializados en el abuso de sustancias.    Siga estas instrucciones en madrid casa:  A sign telling a person not to drink beer, wine, or hard liquor.   Medicamentos    Use los medicamentos de venta maddy y los recetados solamente edmundo se lo haya indicado el médico.  Consulte antes de empezar a abiel cualquier medicamento, hierbas o suplementos nuevos.  Instrucciones generales    Pídales a merced amistades y familiares que apoyen madrid decisión de mantenerse sobrio.  Evite los lugares en los que se sirve alcohol.  Angella un plan para lidiar con las situaciones tentadoras.  Asista a grupos de apoyo con regularidad.  Practique pasatiempos o actividades que le gusten.  No conduzca después de beber alcohol.  ¿Cómo se previene?  No angel alcohol si el médico se lo prohíbe.  Si renzo alcohol:  Limite la cantidad que renzo a lo siguiente:  De 0 a 1 medida por día para las mujeres que no están embarazadas.  De 0 a 2 medidas por día para los hombres.  Sepa cuánta cantidad de alcohol hay en las bebidas que leatha. En los [place], fabiana medida equivale a fabiana botella de cerveza de 12 oz (355 ml), un vaso de vino de 5 oz (148 ml) o un vaso de fabiana bebida alcohólica de gisela graduación de 1½ oz (44 ml).  Si tiene fabiana afección de cassie mental, busque tratamiento.  Lleve un estilo de jane saludable, que puede incorporar lo siguiente:  Meditación o respiración profunda.  Realizar actividad física.  Pasar tiempo en contacto con la naturaleza.  Escuchar música.  Charlar con algún familiar o amigo de confianza.  Si eres adolescente:  No bebas alcohol. Kelly las reuniones en las que puedas tener la tentación de beber alcohol.  No tengas miedo de negarte si alguien te ofrece alcohol. Habla sin reservas acerca de por qué no quieres beber alcohol. Sé un ejemplo positivo para los demás al no consumir alcohol.  Construye relaciones con amigos que no beban.  Dónde obtener más información  Substance Abuse and Mental Health Services Administration (Administración de Servicios por Abuso de Sustancias y Cassie Mental): samhsa.gov  Alcohólicos Anónimos: aa.org  Comuníquese con un médico si:  No puede abiel los medicamentos edmundo se lo gregory indicado.  [place] síntomas empeoran o tiene síntomas de abstinencia cuando cortes de beber.  Vuelve a comenzar a beber (recaída) y los síntomas empeoran.  Solicite ayuda de inmediato si:  Tiene pensamientos acerca de lastimarse o lastimar a otras personas.  Busque ayuda de inmediato si alguna vez siente que puede hacerse daño a usted mismo o a otros, o tiene pensamientos de poner fin a madrid jane. Diríjase al centro de urgencias más cercano o:  Llame al 911.  Llame a National Suicide Prevention Lifeline (Línea Telefónica Nacional para la Prevención del Suicidio) al 1-864.617.1188 o al 988. Está disponible las 24 horas del día.  Envíe un mensaje de texto a la línea para casos de crisis al [id number].  Resumen  El trastorno por consumo de bebidas alcohólicas es fabiana afección en la que el consumo de alcohol altera la jane cotidiana. Las personas con esta afección consumen bebidas alcohólicas en exceso y no pueden controlar madrid consumo.  El tratamiento puede incluir desintoxicación, asesoramiento psicológico, medicamentos y grupos de apoyo.  Pida ayuda a amigos y familiares. Evite situaciones en las que se sirva alcohol.  Busque ayuda de inmediato si tiene pensamientos acerca de lastimarse a usted mismo o a otras personas.  Esta información no tiene edmundo fin reemplazar el consejo del médico. Asegúrese de hacerle al médico cualquier pregunta que tenga.

## 2024-05-08 NOTE — ED PROVIDER NOTE - HIV OFFER
Opt out
FAMILY HISTORY:  Family history of liver cancer, Mother    Sibling  Still living? No  Family history of psoriasis, Age at diagnosis: Age Unknown  Family history of ulcerative colitis, Age at diagnosis: Age Unknown

## 2024-05-08 NOTE — ED PROVIDER NOTE - ENMT, MLM
Airway patent, Nasal mucosa clear. Mouth with normal mucosa. Throat has no vesicles, no oropharyngeal exudates and uvula is midline. OP clear no history of blood product transfusion

## 2024-05-08 NOTE — ED PROVIDER NOTE - PATIENT PORTAL LINK FT
You can access the FollowMyHealth Patient Portal offered by [facility] by registering at the following website: [email or web address]. By joining [employer]’s FollowMyHealth portal, you will also be able to view your health information using other applications (apps) compatible with our system.

## 2024-05-09 VITALS
SYSTOLIC BLOOD PRESSURE: 141 MMHG | HEART RATE: 88 BPM | TEMPERATURE: 98 F | RESPIRATION RATE: 15 BRPM | OXYGEN SATURATION: 100 % | DIASTOLIC BLOOD PRESSURE: 92 MMHG

## 2024-05-09 RX ADMIN — Medication 50 MILLIGRAM(S): at 03:08

## 2024-05-09 NOTE — ED ADULT NURSE REASSESSMENT NOTE - NS ED NURSE REASSESS COMMENT FT1
Assumed care of patient @ 0215 from ANDREA Rosales RN. Patient resting on stretcher, AxOx4, RR even and unlabored, in NAD at this time. Patient able to make needs known; offers no complaints at this time.

## 2024-05-13 ENCOUNTER — EMERGENCY (EMERGENCY)
Facility: HOSPITAL | Age: 44
LOS: 0 days | Discharge: HOME | End: 2024-05-14
Attending: EMERGENCY MEDICINE
Payer: SELF-PAY

## 2024-05-13 VITALS
SYSTOLIC BLOOD PRESSURE: 146 MMHG | HEART RATE: 97 BPM | DIASTOLIC BLOOD PRESSURE: 111 MMHG | RESPIRATION RATE: 18 BRPM | WEIGHT: 164.91 LBS | TEMPERATURE: 98 F | HEIGHT: 65 IN | OXYGEN SATURATION: 100 %

## 2024-05-13 DIAGNOSIS — R41.82 ALTERED MENTAL STATUS, UNSPECIFIED: ICD-10-CM

## 2024-05-13 DIAGNOSIS — F10.129 ALCOHOL ABUSE WITH INTOXICATION, UNSPECIFIED: ICD-10-CM

## 2024-05-13 PROCEDURE — 99285 EMERGENCY DEPT VISIT HI MDM: CPT

## 2024-05-13 PROCEDURE — 99283 EMERGENCY DEPT VISIT LOW MDM: CPT

## 2024-05-13 PROCEDURE — 99053 MED SERV 10PM-8AM 24 HR FAC: CPT

## 2024-05-13 NOTE — ED PROVIDER NOTE - CLINICAL SUMMARY MEDICAL DECISION MAKING FREE TEXT BOX
presents with altered mental status, known ETOH user.  +Slurred, sluggish behavior.  likely EtOH intoxication. Airway maintained. Unlikely intracranial bleed, opioid intoxication or coingestion, sepsis, hypothyroidism.  Suspect likely transient course of intoxication with expected  improvement of symptoms as patient metabolizes offending agent.    Workup: frequent reassessments

## 2024-05-13 NOTE — ED ADULT NURSE NOTE - CHIEF COMPLAINT QUOTE
Pt BIBEMS from street, states he drank a lot of tequila tonight. Pt denies complaints. No s/s of trauma.

## 2024-05-13 NOTE — ED PROVIDER NOTE - OBJECTIVE STATEMENT
44 year old m BIBEMS for public intoxication with alcohol.  There was no reports of trauma per EMS.  Pt too intoxicated at this time to provide significant hx.

## 2024-05-13 NOTE — ED PROVIDER NOTE - PHYSICAL EXAMINATION
Gen: Well appearing in NAD  Head: NC/AT  Neck: trachea midline  Resp:  No distress  Ext: no deformities  Neuro:  Slurred and sluggish  Skin:  Warm and dry as visualized  Psych:  Normal affect and mood

## 2024-05-13 NOTE — ED ADULT NURSE NOTE - OBJECTIVE STATEMENT
Pt is 43yo M who presents to ED with c/o AMS. AS per pt, pt Pt is 43yo M who presents to ED with c/o AMS. AS per pt, pt drank "a lot of tequila." AOB. Pt did not elaborate on rest of sotry when asked. pt does not appear in acute distress.   denies chest pain, SOB

## 2024-05-13 NOTE — ED PROVIDER NOTE - PATIENT PORTAL LINK FT
You can access the FollowMyHealth Patient Portal offered by [facility] by registering at the following website: [email or web address]. By joining [employer]’s FollowMyHealth portal, you will also be able to view your health information using other applications (apps) compatible with our system.

## 2024-05-13 NOTE — ED ADULT TRIAGE NOTE - CHIEF COMPLAINT QUOTE
Pt BIBEMS from street, states he drank a lot of tequila tonight. Pt denies complaints. Pt BIBEMS from street, states he drank a lot of tequila tonight. Pt denies complaints. No s/s of trauma.

## 2024-05-14 VITALS
DIASTOLIC BLOOD PRESSURE: 71 MMHG | RESPIRATION RATE: 18 BRPM | OXYGEN SATURATION: 98 % | SYSTOLIC BLOOD PRESSURE: 109 MMHG | HEART RATE: 110 BPM | TEMPERATURE: 98 F

## 2024-05-14 NOTE — ED ADULT NURSE REASSESSMENT NOTE - NS ED NURSE REASSESS COMMENT FT1
Pt is breathing unlabored and spontaneously. Pt does not appear in acute distress or discomfort. Safety and comfort maintained.

## 2024-05-14 NOTE — ED ADULT NURSE REASSESSMENT NOTE - NS ED NURSE REASSESS COMMENT FT1
Pt is breathing unlabored and spontaneously. Pt arousalable to voice and touch. Pt does not appear in acute distress or discomfort. Safety and comfort maintained.

## 2025-01-20 ENCOUNTER — EMERGENCY (EMERGENCY)
Facility: HOSPITAL | Age: 45
LOS: 0 days | Discharge: HOME | End: 2025-01-20
Attending: STUDENT IN AN ORGANIZED HEALTH CARE EDUCATION/TRAINING PROGRAM
Payer: SELF-PAY

## 2025-01-20 VITALS
OXYGEN SATURATION: 95 % | HEART RATE: 73 BPM | TEMPERATURE: 97 F | RESPIRATION RATE: 15 BRPM | WEIGHT: 160.06 LBS | DIASTOLIC BLOOD PRESSURE: 113 MMHG | SYSTOLIC BLOOD PRESSURE: 167 MMHG | HEIGHT: 67 IN

## 2025-01-20 VITALS
SYSTOLIC BLOOD PRESSURE: 145 MMHG | TEMPERATURE: 97 F | RESPIRATION RATE: 18 BRPM | OXYGEN SATURATION: 96 % | HEART RATE: 72 BPM | DIASTOLIC BLOOD PRESSURE: 88 MMHG

## 2025-01-20 DIAGNOSIS — F10.129 ALCOHOL ABUSE WITH INTOXICATION, UNSPECIFIED: ICD-10-CM

## 2025-01-20 LAB — GLUCOSE BLDC GLUCOMTR-MCNC: 93 MG/DL — SIGNIFICANT CHANGE UP (ref 70–99)

## 2025-01-20 NOTE — ED PROVIDER NOTE - CCCP TRG CHIEF CMPLNT
tolerated (unless you have received specific instructions from your doctor).  \" If you feel nauseated, continue with liquids until the nausea is gone.  \" Notify your physician if you have not urinated within 8 hours after the procedure.  \" Resume your medications unless otherwise instructed.      altered mental status

## 2025-01-20 NOTE — ED PROVIDER NOTE - PATIENT PORTAL LINK FT
You can access the FollowMyHealth Patient Portal offered by [facility] by registering at the following website: [email or web address]. By joining [employer]’s FollowMyHealth portal, you will also be able to view your health information using other applications (apps) compatible with our system.

## 2025-01-20 NOTE — ED ADULT NURSE NOTE - CHIEF COMPLAINT QUOTE
Pt coming in by AMS from [facility] as per EMS. Pt has +AOB. Pt has no complaints. No identification on patient.

## 2025-01-20 NOTE — ED PROVIDER NOTE - PROGRESS NOTE DETAILS
Alert and oriented x 3 , ambulatory with steady gait, normal speech, will be discharged with return precautions

## 2025-01-20 NOTE — ED PROVIDER NOTE - PHYSICAL EXAMINATION
Constitutional: NAD AAOx1, follows commands, + AOB  HEENT: NCAT, PERRL, EOMI, No davison sign, no raccoon eyes, no hemotympanum, no csf rhinorrhea, no nasal septal hematoma  Cardiac: RRR, no mrg  Resp: unlabored breathing, b/l breath sounds  GI: Abd s/nt/nd  Neuro: grossly normal and intact GCS 15 no neuro deficits  MSK: Full ROM to extremities/joints, no deformities, no spinal ttp/step offs  Skin: No rashes, no bruising to chest, back, abdomen or extremities, nodule 1.5cm x 1.5cm to forehead

## 2025-01-20 NOTE — ED PROVIDER NOTE - CLINICAL SUMMARY MEDICAL DECISION MAKING FREE TEXT BOX
45 year-old male with unknown medical history presents BIBA from [facility] per EMS. History/ROS limited secondary to intoxicated.     Observe for clinical sobriety/finger stick. 45 year-old male with unknown medical history presents BIBA from [facility] per EMS. History/ROS limited secondary to intoxicated.     No signs of trauma on exam    Observe for clinical sobriety/finger stick.

## 2025-01-20 NOTE — ED ADULT TRIAGE NOTE - CHIEF COMPLAINT QUOTE
Pt coming in by AMS from [facility] as per EMS. Pt has +AOB. Pt has no complaints. No identification on patient.

## 2025-03-25 ENCOUNTER — EMERGENCY (EMERGENCY)
Facility: HOSPITAL | Age: 45
LOS: 0 days | Discharge: HOME | End: 2025-03-26
Attending: STUDENT IN AN ORGANIZED HEALTH CARE EDUCATION/TRAINING PROGRAM
Payer: SELF-PAY

## 2025-03-25 VITALS
RESPIRATION RATE: 18 BRPM | HEART RATE: 96 BPM | SYSTOLIC BLOOD PRESSURE: 125 MMHG | HEIGHT: 63 IN | WEIGHT: 160.06 LBS | TEMPERATURE: 98 F | OXYGEN SATURATION: 97 % | DIASTOLIC BLOOD PRESSURE: 96 MMHG

## 2025-03-25 DIAGNOSIS — R41.82 ALTERED MENTAL STATUS, UNSPECIFIED: ICD-10-CM

## 2025-03-25 DIAGNOSIS — F10.129 ALCOHOL ABUSE WITH INTOXICATION, UNSPECIFIED: ICD-10-CM

## 2025-03-25 PROCEDURE — 99285 EMERGENCY DEPT VISIT HI MDM: CPT

## 2025-03-25 PROCEDURE — 99283 EMERGENCY DEPT VISIT LOW MDM: CPT

## 2025-03-25 NOTE — ED ADULT TRIAGE NOTE - INTERNATIONAL TRAVEL
Continue with mucinex, nasal sprays, and inhalers  Complete full course of antibiotics  May use a juli pot or saline rinses as tolerated  May use tylenol for headaches  Increase water intake  If symptoms worsen follow up with PCP  Patient verbalized understanding and agrees with plan of care    No

## 2025-03-25 NOTE — ED PROVIDER NOTE - NSFOLLOWUPINSTRUCTIONS_ED_ALL_ED_FT
Dejar de beber alcohol.    Abuso de alcohol    LO QUE NECESITA SABER:    El abuso de alcohol significa que usted renzo más de los límites diarios o semanales recomendados. Usted puede estar bebiendo alcohol regularmente o bebiendo grandes cantidades en un corto período (atracones de bebida). Usted sigue tomando, aun cuando esto le cause problemas legales, laborales o con mari relaciones.    INSTRUCCIONES SOBRE EL SUKHWINDER HOSPITALARIA:    Llame al número local de emergencias (911 en los [place]), o pídale a alguien que llame si:    Tiene dolor en el pecho o dificultad para respirar de forma repentina.    Usted quiere lastimarse o lastimar a otros.    Usted sufre fabiana convulsión.  Busque atención médica de inmediato si:    Usted no puede dejar de vomitar o vomita xochitl.    Llame a madrid médico si:    Usted tiene alucinaciones (ve o escucha cosas que no son reales).    Usted tiene preguntas o inquietudes acerca de madrid condición o cuidado.  Medicamentos:    Suplementos vitamínicospara tratar los niveles bajo de vitamina. El alcohol puede impedir la capacidad de madrid cuerpo para absorber suficiente vitaminas edmundo la vitamina B1. Los suplementos vitamínicos también pueden administrarse para prevenir los daños al cerebro relacionados con el consumo de alcohol.    [place] mari medicamentos edmundo se le haya indicado.Consulte con madrid médico si usted yoel que madrid medicamento no le está ayudando o si presenta efectos secundarios. Infórmele al médico si usted es alérgico a algún medicamento. Mantenga fabiana lista actualizada de los medicamentos, las vitaminas y los productos herbales que leatha. Incluya los siguientes datos de los medicamentos: cantidad, frecuencia y motivo de administración. Traiga con usted la lista o los envases de las píldoras a mari citas de seguimiento. Lleve la lista de los medicamentos con usted en marifer de fabiana emergencia.  Problemas de vidal que puede causar el abuso del alcohol:    Cáncer en hígado, páncreas, estómago, colon, riñón o mamas    Derrame cerebral o ataque cardíaco    Enfermedad hepática, renal o pulmonar    Desmayos, pérdida de memoria, daño cerebral o demencia    Diabetes, problemas del sistema inmunológico o deficiencia de tiamina (vitamina B1)    Problemas para usted y madrid bebé si renzo zonia el embarazo  Límites recomendados de alcohol:Fabiana bebida se define edmundo 12 onzas de cerveza, 5 onzas de vino o 1.5 onzas de licor, edmundo el whisky.    Los hombres de 21 a 64 añosdeberían limitar el consumo de alcohol a 2 tragos al día. No tome más de 4 bebidas por día o más de 14 en fabiana semana.    Todos los hombres y las mujeres de 65 años o másdeberían limitar el consumo de alcohol a 1 trago por día. No tome más de 3 bebidas por día o más de 7 en fabiana semana. No consuma bebidas alcohólicas si está embarazada.  Maneje el consumo de alcohol:    Trabaje con los médicos en los objetivos para beber menos.[place] puede ayudar a prevenir problemas de vidal. Por ejemplo, puede empezar por planificar madrid consumo semanal de alcohol. Será más fácil abiel menos bebidas si planifica con antelación.    Cuando angel alcohol, acompáñelo con comida.La comida evitará que el alcohol entre en madrid sistema demasiado rápido. Coma antes de abiel madrid primera bebida alcohólica.    Calcule con cuidado el tiempo de mari bebidas.No tome más de fabiana bebida por hora. [place] líquido, edmundo agua, café o un refresco, entre las bebidas alcohólicas.    No maneje si ha tomado alcohol.Planifique con antelación para tener un viaje seguro a casa. Asegúrese de que alguien que no haya estado bebiendo lo pueda llevar a casa. Planifique el uso de un taxi u otro servicio de transporte, si lo necesita.    No angel alcohol si está tomando lesley medicamento.El alcohol es peligroso cuando se combina con ciertos medicamentos, edmundo acetaminofeno o un medicamento para la presión arterial. Hable con madrid médico acerca de todos los medicamentos que está tomando.  Acuda a la consulta de control con madrid médico según las indicaciones:Anote mari preguntas para que se acuerde de hacerlas zonia mari visitas.    Para apoyo y más información:    Alcoholics Anonymous  Web Address: http://www.aa.org  Substance Abuse and Mental Health Services Administration (SAMA)  [address]  [place] [address]  Web Address: http://www.samhsa.gov or https://dpt2.samhsa.gov/treatment/

## 2025-03-25 NOTE — ED ADULT TRIAGE NOTE - CHIEF COMPLAINT QUOTE
pt brought in by EMS for intoxication found drinking with friends at [employer].  + alcohol on breath. pt refusing to answer  further questions. no distress noted. pt placed in front of nursing station. bed alarm active

## 2025-03-25 NOTE — ED ADULT TRIAGE NOTE - BP NONINVASIVE DIASTOLIC (MM HG)
96 acetaminophen 325 mg oral tablet: 2 tab(s) orally every 8 hours as needed for mild pain   aspirin 325 mg oral tablet: 1 tab(s) orally 2 times a day x 30 days after surgery  Centrum Silver Men&#x27;s oral tablet: 1 tab(s) orally once a day  enalapril 20 mg oral tablet: 1 tab(s) orally once a day  levothyroxine 125 mcg (0.125 mg) oral tablet: 1 tab(s) orally once a day  Lipitor 40 mg oral tablet: 1 tab(s) orally once a day  Metoprolol Succinate ER 25 mg oral tablet, extended release: 1 tab(s) orally once a day  oxyCODONE 5 mg oral tablet: 1-2 tab(s) orally every 4-6 hours as needed for moderate to severe postop pain MDD:6  polyethylene glycol 3350 oral powder for reconstitution: 17 gram(s) orally once a day (at bedtime)

## 2025-03-25 NOTE — ED PROVIDER NOTE - PATIENT PORTAL LINK FT
You can access the FollowMyHealth Patient Portal offered by [facility] by registering at the following website: [email or web address]. By joining [employer]’s FollowMyHealth portal, you will also be able to view your health information using other applications (apps) compatible with our system.

## 2025-03-25 NOTE — ED ADULT NURSE NOTE - CHIEF COMPLAINT QUOTE
pt brought in by EMS for intoxication found drinking with friends at [employer].  + alcohol on breath. pt refusing to answer  further questions. no distress noted. pt placed in front of nursing station. bed alarm active

## 2025-03-25 NOTE — ED ADULT NURSE NOTE - OBJECTIVE STATEMENT
45 male presents to ED c/o alcohol intoxication. Denies trauma or falls. Denies any pain, n/v/d/fever/chills, cp, back pain or abd pain. Pt sleeping in bed with no acute distress.

## 2025-03-25 NOTE — ED PROVIDER NOTE - CLINICAL SUMMARY MEDICAL DECISION MAKING FREE TEXT BOX
45M presenting for alcohol intoxication. No trauma or falls. He denies any pain, nausea, vomiting, headache. No chest pain, back pain, abdominal pain, pain in arms or legs.    On exam, NAD, non-toxic appearing, VS wnl, awake, alert, oriented x 3, neurologically intact, breathing comfortably, lungs CTAB, no crackles or wheezing, no murmurs, abdomen nondistended, no ttp, no rebound or guarding, no rashes, no peripheral edema, no joint swelling, warm to touch, moving all 4 extremities, no evidence of external trauma.    Given benign physical exam and normal vital signs, will await for clinical sobriety. At this time, there is low utility in labs and imaging. Likely TBDC.

## 2025-03-25 NOTE — ED PROVIDER NOTE - PROGRESS NOTE DETAILS
received on s/o from dr. salcedo. patient here with alcohol intox. pending sobriety. Patient reassessed, currently A&Ox2, slurred speech, still clinically intoxicated. on exam nad, moving all ext. no signs of trauma. plan to observe. patient reassessed, clear speech, stable gait. will be discharged.

## 2025-03-26 VITALS
RESPIRATION RATE: 16 BRPM | SYSTOLIC BLOOD PRESSURE: 133 MMHG | OXYGEN SATURATION: 99 % | DIASTOLIC BLOOD PRESSURE: 84 MMHG | TEMPERATURE: 98 F | HEART RATE: 99 BPM

## 2025-03-26 NOTE — ED ADULT NURSE REASSESSMENT NOTE - NS ED NURSE REASSESS COMMENT FT1
Pt ambulated with steady gait. Pt denies any complaints or concerns at this time. Pt states he feels much better and is ready for discharge. Pt provided sandwich. Safety and comfort measures maintained.

## 2025-03-26 NOTE — ED ADULT NURSE REASSESSMENT NOTE - NS ED NURSE REASSESS COMMENT FT1
Pt able to ambulate successfully with minimal assistance but states he still feels a bit unsteady on feet from drinking alcohol earlier. MD Samano aware. Safety and comfort measures maintained.

## 2025-03-26 NOTE — ED ADULT NURSE REASSESSMENT NOTE - NS ED NURSE REASSESS COMMENT FT1
Obtained bedside report from VOLODYMYR Danielle at 02:00. Pt resting comfortably in bed, breathing even and unlabored on room air. Pt in no acute signs of distress. VS as charted. Awaiting pt to awaken from alcohol intoxication. Pt in view of nurses station with bed alarm on bed. Safety and comfort measures maintained.

## 2025-07-14 NOTE — ED ADULT TRIAGE NOTE - BMI (KG/M2)
Render In Strict Bullet Format?: No Detail Level: Zone Continue Regimen: Tremfya q8 weeks once BW received 26.6

## 2025-07-19 ENCOUNTER — EMERGENCY (EMERGENCY)
Facility: HOSPITAL | Age: 45
LOS: 0 days | Discharge: HOME | End: 2025-07-20
Attending: STUDENT IN AN ORGANIZED HEALTH CARE EDUCATION/TRAINING PROGRAM
Payer: SELF-PAY

## 2025-07-19 VITALS
TEMPERATURE: 98 F | SYSTOLIC BLOOD PRESSURE: 124 MMHG | HEART RATE: 98 BPM | WEIGHT: 149.91 LBS | RESPIRATION RATE: 18 BRPM | OXYGEN SATURATION: 99 % | DIASTOLIC BLOOD PRESSURE: 81 MMHG

## 2025-07-19 DIAGNOSIS — F10.129 ALCOHOL ABUSE WITH INTOXICATION, UNSPECIFIED: ICD-10-CM

## 2025-07-19 PROCEDURE — 99285 EMERGENCY DEPT VISIT HI MDM: CPT

## 2025-07-19 PROCEDURE — 99283 EMERGENCY DEPT VISIT LOW MDM: CPT

## 2025-07-20 VITALS
RESPIRATION RATE: 18 BRPM | OXYGEN SATURATION: 99 % | TEMPERATURE: 98 F | SYSTOLIC BLOOD PRESSURE: 120 MMHG | DIASTOLIC BLOOD PRESSURE: 88 MMHG | HEART RATE: 95 BPM